# Patient Record
Sex: MALE | Race: WHITE | NOT HISPANIC OR LATINO
[De-identification: names, ages, dates, MRNs, and addresses within clinical notes are randomized per-mention and may not be internally consistent; named-entity substitution may affect disease eponyms.]

---

## 2020-03-19 PROBLEM — Z00.00 ENCOUNTER FOR PREVENTIVE HEALTH EXAMINATION: Status: ACTIVE | Noted: 2020-03-19

## 2021-07-23 ENCOUNTER — NON-APPOINTMENT (OUTPATIENT)
Age: 31
End: 2021-07-23

## 2021-07-23 ENCOUNTER — APPOINTMENT (OUTPATIENT)
Dept: OPHTHALMOLOGY | Facility: CLINIC | Age: 31
End: 2021-07-23
Payer: COMMERCIAL

## 2021-07-23 PROCEDURE — 92004 COMPRE OPH EXAM NEW PT 1/>: CPT

## 2021-07-23 PROCEDURE — 99072 ADDL SUPL MATRL&STAF TM PHE: CPT

## 2021-07-30 ENCOUNTER — APPOINTMENT (OUTPATIENT)
Dept: OPHTHALMOLOGY | Facility: CLINIC | Age: 31
End: 2021-07-30

## 2021-08-11 ENCOUNTER — APPOINTMENT (OUTPATIENT)
Dept: OPHTHALMOLOGY | Facility: CLINIC | Age: 31
End: 2021-08-11

## 2024-03-22 ENCOUNTER — APPOINTMENT (OUTPATIENT)
Dept: ENDOCRINOLOGY | Facility: CLINIC | Age: 34
End: 2024-03-22
Payer: COMMERCIAL

## 2024-03-22 VITALS
SYSTOLIC BLOOD PRESSURE: 130 MMHG | BODY MASS INDEX: 38.08 KG/M2 | HEART RATE: 76 BPM | OXYGEN SATURATION: 98 % | WEIGHT: 266 LBS | DIASTOLIC BLOOD PRESSURE: 64 MMHG | RESPIRATION RATE: 14 BRPM | TEMPERATURE: 98 F | HEIGHT: 70 IN

## 2024-03-22 DIAGNOSIS — E26.9 HYPERALDOSTERONISM, UNSPECIFIED: ICD-10-CM

## 2024-03-22 DIAGNOSIS — E04.9 NONTOXIC GOITER, UNSPECIFIED: ICD-10-CM

## 2024-03-22 DIAGNOSIS — E66.9 OBESITY, UNSPECIFIED: ICD-10-CM

## 2024-03-22 PROCEDURE — 99214 OFFICE O/P EST MOD 30 MIN: CPT

## 2024-03-22 PROCEDURE — G2211 COMPLEX E/M VISIT ADD ON: CPT | Mod: NC,1L

## 2024-03-22 NOTE — HISTORY OF PRESENT ILLNESS
[FreeTextEntry1] : This is a young 34-year-old white male who has a past medical history of prolactinoma diagnosed in the year 2012.  Patient initially was treated by different endocrinologist but we are seen in the office approximately 6 years ago.  Since then the patient has been the cabergoline which she took for short period of time and he is now off the medicine for many years patient is complaining of a very minimal discharge from the left breast on on significant manipulation of the left nipple.  He denies any headache chest pain shortness of breath decline in his sexual function.  The weight and for this he has been on fentanyl remind.  Patient has now start to diet and since 4 months he has lost approximately 73 pounds.  He is maxed weight was 346 pounds.  His sexual drive is preserved there is any skin or joint pains or absent of hair from the body.  Patient is concerned about the possible of weight gain and use of GLP-1 analog presence of a prolactinoma.  Social history essentially negative he claimed that he quit drinking alcohol and smoking and he  for approximately 2 years.  He has 2 biological children.  History his father has type 2 diabetes and his mother has skin cancer.

## 2024-03-22 NOTE — REVIEW OF SYSTEMS
[Fatigue] : fatigue [Decreased Appetite] : appetite not decreased [Recent Weight Loss (___ Lbs)] : recent weight loss: [unfilled] lbs [Fever] : no fever [Chills] : no chills [Dizziness] : no dizziness [Pain/Numbness of Digits] : no pain/numbness of digits [Negative] : Heme/Lymph [de-identified] : No significant gynecomastia and no discharge on manipulation of the left breast

## 2024-03-22 NOTE — ASSESSMENT
[FreeTextEntry1] : White male with a past medical history of prolactinoma who is currently off any medication.  His last MRI of the brain was performed in the year 2017 which was reported to be benign patient has not episodes of low test and negative.  Obesity and he is concerned about his weight and he wishes to give a trial to GLP-1 analogs but he is skeptical about the side effects that they can cause cancer.  Recommendation 1.  I have advised the patient to repeat a blood test to check his prolactin level along with the cortisol and thyroid profile which may explain his sudden weight loss. 2.  If there is evidence of any elevated prolactin levels then we  may proceed by obtaining MRI of the pituitary for further evaluate.  Further management will be dictated by the prolactin levels and the size of the tumor.  The plan was discussed in detail with the patient thank you

## 2024-04-19 DIAGNOSIS — E66.9 OBESITY, UNSPECIFIED: ICD-10-CM

## 2024-04-19 DIAGNOSIS — Z86.018 PERSONAL HISTORY OF OTHER BENIGN NEOPLASM: ICD-10-CM

## 2024-04-19 DIAGNOSIS — E23.7 DISORDER OF PITUITARY GLAND, UNSPECIFIED: ICD-10-CM

## 2024-06-12 ENCOUNTER — APPOINTMENT (OUTPATIENT)
Dept: ENDOCRINOLOGY | Facility: CLINIC | Age: 34
End: 2024-06-12